# Patient Record
Sex: FEMALE | Race: WHITE | Employment: UNEMPLOYED | URBAN - METROPOLITAN AREA
[De-identification: names, ages, dates, MRNs, and addresses within clinical notes are randomized per-mention and may not be internally consistent; named-entity substitution may affect disease eponyms.]

---

## 2017-02-24 ENCOUNTER — APPOINTMENT (OUTPATIENT)
Dept: GENERAL RADIOLOGY | Age: 52
End: 2017-02-24
Attending: NURSE PRACTITIONER
Payer: MEDICAID

## 2017-02-24 ENCOUNTER — HOSPITAL ENCOUNTER (OUTPATIENT)
Age: 52
Discharge: HOME | End: 2017-02-24
Payer: MEDICAID

## 2017-02-24 VITALS
SYSTOLIC BLOOD PRESSURE: 92 MMHG | BODY MASS INDEX: 30.12 KG/M2 | HEART RATE: 76 BPM | TEMPERATURE: 101 F | RESPIRATION RATE: 18 BRPM | DIASTOLIC BLOOD PRESSURE: 52 MMHG | WEIGHT: 170 LBS | HEIGHT: 63 IN | OXYGEN SATURATION: 97 %

## 2017-02-24 DIAGNOSIS — J40 BRONCHITIS: Primary | ICD-10-CM

## 2017-02-24 PROCEDURE — 99214 OFFICE O/P EST MOD 30 MIN: CPT

## 2017-02-24 PROCEDURE — 96372 THER/PROPH/DIAG INJ SC/IM: CPT

## 2017-02-24 PROCEDURE — 94640 AIRWAY INHALATION TREATMENT: CPT

## 2017-02-24 PROCEDURE — 71020 XR CHEST PA + LAT CHEST (CPT=71020): CPT

## 2017-02-24 RX ORDER — IPRATROPIUM BROMIDE AND ALBUTEROL SULFATE 2.5; .5 MG/3ML; MG/3ML
3 SOLUTION RESPIRATORY (INHALATION) ONCE
Status: COMPLETED | OUTPATIENT
Start: 2017-02-24 | End: 2017-02-24

## 2017-02-24 RX ORDER — CLARITHROMYCIN 250 MG/1
250 TABLET, FILM COATED ORAL 2 TIMES DAILY
Qty: 20 TABLET | Refills: 0 | Status: SHIPPED | OUTPATIENT
Start: 2017-02-24 | End: 2017-03-06

## 2017-02-24 RX ORDER — METHYLPREDNISOLONE SODIUM SUCCINATE 125 MG/2ML
125 INJECTION, POWDER, LYOPHILIZED, FOR SOLUTION INTRAMUSCULAR; INTRAVENOUS ONCE
Status: COMPLETED | OUTPATIENT
Start: 2017-02-24 | End: 2017-02-24

## 2017-02-24 RX ORDER — ACETAMINOPHEN 500 MG
1000 TABLET ORAL ONCE
Status: COMPLETED | OUTPATIENT
Start: 2017-02-24 | End: 2017-02-24

## 2017-02-24 RX ORDER — ALBUTEROL SULFATE 90 UG/1
2 AEROSOL, METERED RESPIRATORY (INHALATION) EVERY 4 HOURS PRN
Qty: 1 INHALER | Refills: 1 | Status: SHIPPED | OUTPATIENT
Start: 2017-02-24 | End: 2017-03-26

## 2017-02-24 RX ORDER — PREDNISONE 20 MG/1
20 TABLET ORAL 2 TIMES DAILY
Qty: 10 TABLET | Refills: 0 | Status: SHIPPED | OUTPATIENT
Start: 2017-02-24 | End: 2017-03-01

## 2017-02-24 RX ORDER — CODEINE PHOSPHATE AND GUAIFENESIN 10; 100 MG/5ML; MG/5ML
5 SOLUTION ORAL EVERY 6 HOURS PRN
Qty: 180 ML | Refills: 0 | Status: SHIPPED | OUTPATIENT
Start: 2017-02-24 | End: 2017-03-08

## 2017-02-24 RX ORDER — DOXYCYCLINE HYCLATE 100 MG/1
100 CAPSULE ORAL 2 TIMES DAILY
Qty: 14 CAPSULE | Refills: 0 | Status: SHIPPED | OUTPATIENT
Start: 2017-02-24 | End: 2017-03-03

## 2017-02-24 NOTE — ED NOTES
States back pain has resolved post Tylenol 1000 mg po however now states has a headache - pain 3/10. Trung Fairchild NP notified. No adverse reactions post Solu Medrol injection.

## 2017-02-24 NOTE — ED INITIAL ASSESSMENT (HPI)
Couple days productive cough - chest wall tenderness with coughing - denies chest pain  / fever (101) / nasal congestion

## 2017-02-25 NOTE — ED PROVIDER NOTES
Patient Seen in: [address] [facility]    History   Patient presents with:  Cough/URI    Stated Complaint: coughing    HPI  71-year-old female who presents to the [address] [place] [address] with her  with complaints of productive cough for the past couple days mouth nightly. Meloxicam 15 MG Oral Tab,  Take 15 mg by mouth daily.        Family History   Problem Relation Age of Onset   • Cancer Mother 62     breast cancer   • Breast Cancer Mother 61   • Heart Disease Neg    • Stroke Neg    • Diabetes Neg    • Hype exudate. Eyes: Conjunctivae and EOM are normal. Pupils are equal, round, and reactive to light. Neck: Normal range of motion. Neck supple. Cardiovascular: Normal rate, regular rhythm and normal heart sounds.     Pulmonary/Chest: Effort normal. No acce physician for further evaluation and care. Patient states they understand diagnosis, followup plan and agree with and understand  discharge instructions and plan. I answered all of the patient's questions prior to discharge.    Disposition and Plan     Clin

## 2017-03-08 ENCOUNTER — OFFICE VISIT (OUTPATIENT)
Dept: FAMILY MEDICINE CLINIC | Facility: CLINIC | Age: 52
End: 2017-03-08

## 2017-03-08 DIAGNOSIS — J20.9 BRONCHITIS WITH BRONCHOSPASM: Primary | ICD-10-CM

## 2017-03-08 PROCEDURE — 99213 OFFICE O/P EST LOW 20 MIN: CPT | Performed by: FAMILY MEDICINE

## 2017-03-08 RX ORDER — METHYLPREDNISOLONE 4 MG/1
TABLET ORAL
Qty: 1 KIT | Refills: 0 | Status: SHIPPED | OUTPATIENT
Start: 2017-03-08 | End: 2017-06-02

## 2017-03-08 NOTE — PROGRESS NOTES
HPI:    Patient ID: Jessica Vega is a 45year old female. Patient presents with:  Bronchitis: following up from IC    HPI  Here for follow up on bronchitis  Seen in IC on 2/24/17  Completed doxycycline and steroids.   Last albuterol use was 9 am toda • BRCA gene + Self    • Breast Cancer Maternal Aunt 52   • Breast Cancer Maternal Cousin Female 45      Social History:   Smoking Status: Current Every Day Smoker        Packs/Day: 0.50  Years: 28      Alcohol Use: No                 PHYSICAL EXAM:    Ph

## 2017-03-09 VITALS
TEMPERATURE: 99 F | WEIGHT: 170 LBS | OXYGEN SATURATION: 97 % | HEART RATE: 86 BPM | BODY MASS INDEX: 30 KG/M2 | SYSTOLIC BLOOD PRESSURE: 90 MMHG | DIASTOLIC BLOOD PRESSURE: 62 MMHG | RESPIRATION RATE: 22 BRPM

## 2017-03-27 ENCOUNTER — TELEPHONE (OUTPATIENT)
Dept: FAMILY MEDICINE CLINIC | Facility: CLINIC | Age: 52
End: 2017-03-27

## 2017-03-27 DIAGNOSIS — R92.8 ABNORMAL MAMMOGRAM: Primary | ICD-10-CM

## 2017-03-27 NOTE — TELEPHONE ENCOUNTER
Right breast diagnostic mammo ordered. She can schedule this for [place] or [facility]. Further follow up will be determined after these results.

## 2017-03-27 NOTE — TELEPHONE ENCOUNTER
Pt needs diagnositic mammogram.  Patient has an appointment today for screening mammogram.  Pt not due for screening mammogram until June 2017. Patient was due for 6 month diagnostic mammogram after her last one done in June 2016.   Patient has her appoint

## 2017-03-27 NOTE — TELEPHONE ENCOUNTER
Patient advised. Patient verbalized understanding.   Future Appointments  Date Time Provider Ruthie Rodriguez   3/31/2017 11:20 AM Graham Isabel MD [facility]

## 2017-03-31 ENCOUNTER — TELEPHONE (OUTPATIENT)
Dept: OBGYN CLINIC | Facility: CLINIC | Age: 52
End: 2017-03-31

## 2017-03-31 ENCOUNTER — OFFICE VISIT (OUTPATIENT)
Dept: FAMILY MEDICINE CLINIC | Facility: CLINIC | Age: 52
End: 2017-03-31

## 2017-03-31 VITALS
WEIGHT: 172 LBS | TEMPERATURE: 98 F | BODY MASS INDEX: 30.48 KG/M2 | DIASTOLIC BLOOD PRESSURE: 70 MMHG | HEIGHT: 63 IN | RESPIRATION RATE: 14 BRPM | HEART RATE: 68 BPM | SYSTOLIC BLOOD PRESSURE: 116 MMHG

## 2017-03-31 DIAGNOSIS — M25.562 CHRONIC PAIN OF BOTH KNEES: ICD-10-CM

## 2017-03-31 DIAGNOSIS — Z12.11 SCREENING FOR COLON CANCER: ICD-10-CM

## 2017-03-31 DIAGNOSIS — Z00.00 ANNUAL PHYSICAL EXAM: Primary | ICD-10-CM

## 2017-03-31 DIAGNOSIS — M25.561 CHRONIC PAIN OF BOTH KNEES: ICD-10-CM

## 2017-03-31 DIAGNOSIS — M19.90 ARTHRITIS: ICD-10-CM

## 2017-03-31 DIAGNOSIS — G89.29 CHRONIC PAIN OF BOTH KNEES: ICD-10-CM

## 2017-03-31 LAB
25-HYDROXYVITAMIN D (TOTAL): 28.9 NG/ML (ref 30–100)
ALBUMIN SERPL-MCNC: 3.7 G/DL (ref 3.5–4.8)
ALP LIVER SERPL-CCNC: 66 U/L (ref 41–108)
ALT SERPL-CCNC: 16 U/L (ref 14–54)
AST SERPL-CCNC: 13 U/L (ref 15–41)
BASOPHILS # BLD AUTO: 0.06 X10(3) UL (ref 0–0.1)
BASOPHILS NFR BLD AUTO: 1.1 %
BILIRUB SERPL-MCNC: 0.5 MG/DL (ref 0.1–2)
BUN BLD-MCNC: 10 MG/DL (ref 8–20)
CALCIUM BLD-MCNC: 9 MG/DL (ref 8.3–10.3)
CHLORIDE: 106 MMOL/L (ref 101–111)
CHOLEST SMN-MCNC: 255 MG/DL (ref ?–200)
CO2: 28 MMOL/L (ref 22–32)
CREAT BLD-MCNC: 0.91 MG/DL (ref 0.55–1.02)
EOSINOPHIL # BLD AUTO: 0.11 X10(3) UL (ref 0–0.3)
EOSINOPHIL NFR BLD AUTO: 2 %
ERYTHROCYTE [DISTWIDTH] IN BLOOD BY AUTOMATED COUNT: 13.9 % (ref 11.5–16)
GLUCOSE BLD-MCNC: 93 MG/DL (ref 70–99)
HCT VFR BLD AUTO: 39.9 % (ref 34–50)
HDLC SERPL-MCNC: 59 MG/DL (ref 45–?)
HDLC SERPL: 4.32 {RATIO} (ref ?–4.44)
HGB BLD-MCNC: 13.2 G/DL (ref 12–16)
IMMATURE GRANULOCYTE COUNT: 0.01 X10(3) UL (ref 0–1)
IMMATURE GRANULOCYTE RATIO %: 0.2 %
LDLC SERPL CALC-MCNC: 181 MG/DL (ref ?–130)
LYMPHOCYTES # BLD AUTO: 1.93 X10(3) UL (ref 0.9–4)
LYMPHOCYTES NFR BLD AUTO: 35.7 %
M PROTEIN MFR SERPL ELPH: 7.1 G/DL (ref 6.1–8.3)
MCH RBC QN AUTO: 30.5 PG (ref 27–33.2)
MCHC RBC AUTO-ENTMCNC: 33.1 G/DL (ref 31–37)
MCV RBC AUTO: 92.1 FL (ref 81–100)
MONOCYTES # BLD AUTO: 0.55 X10(3) UL (ref 0.1–0.6)
MONOCYTES NFR BLD AUTO: 10.2 %
NEUTROPHIL ABS PRELIM: 2.75 X10 (3) UL (ref 1.3–6.7)
NEUTROPHILS # BLD AUTO: 2.75 X10(3) UL (ref 1.3–6.7)
NEUTROPHILS NFR BLD AUTO: 50.8 %
NONHDLC SERPL-MCNC: 196 MG/DL (ref ?–130)
PLATELET # BLD AUTO: 273 10(3)UL (ref 150–450)
POTASSIUM SERPL-SCNC: 4.3 MMOL/L (ref 3.6–5.1)
RBC # BLD AUTO: 4.33 X10(6)UL (ref 3.8–5.1)
RED CELL DISTRIBUTION WIDTH-SD: 47 FL (ref 35.1–46.3)
SODIUM SERPL-SCNC: 141 MMOL/L (ref 136–144)
TRIGLYCERIDES: 77 MG/DL (ref ?–150)
TSI SER-ACNC: 2.08 MIU/ML (ref 0.35–5.5)
VLDL: 15 MG/DL (ref 5–40)
WBC # BLD AUTO: 5.4 X10(3) UL (ref 4–13)

## 2017-03-31 PROCEDURE — 84443 ASSAY THYROID STIM HORMONE: CPT | Performed by: FAMILY MEDICINE

## 2017-03-31 PROCEDURE — 80053 COMPREHEN METABOLIC PANEL: CPT | Performed by: FAMILY MEDICINE

## 2017-03-31 PROCEDURE — 85025 COMPLETE CBC W/AUTO DIFF WBC: CPT | Performed by: FAMILY MEDICINE

## 2017-03-31 PROCEDURE — 99396 PREV VISIT EST AGE 40-64: CPT | Performed by: FAMILY MEDICINE

## 2017-03-31 PROCEDURE — 82306 VITAMIN D 25 HYDROXY: CPT | Performed by: FAMILY MEDICINE

## 2017-03-31 PROCEDURE — 80061 LIPID PANEL: CPT | Performed by: FAMILY MEDICINE

## 2017-03-31 RX ORDER — ALPRAZOLAM 1 MG/1
1 TABLET ORAL NIGHTLY PRN
COMMUNITY
End: 2018-02-28

## 2017-03-31 NOTE — TELEPHONE ENCOUNTER
Please advise on abnormal pap from 2014 & what type of appointment needed. Patient saw Dr. Fer Byers for annual in 2017 but never followed up with us from previous pap.

## 2017-03-31 NOTE — PROGRESS NOTES
HPI:   Blanca Gill is a 45year old female who presents for a complete physical exam.   Overdue for f/u on abn pap. Overdue for f/u on mammo - has hx of abn mammo. PHQ noted. Has psychiatrist , Dr. Jennifer Toscano, who he sees every 3 months. EZIO BIOPSY STEREOTACTIC NODULE 2 SITE BILAT  2007      Family History   Problem Relation Age of Onset   • Cancer Mother 62     breast cancer   • Breast Cancer Mother 61   • Heart Disease Neg    • Stroke Neg    • Diabetes Neg    • Hypertension Neg    • B judgment wnl    ASSESSMENT AND PLAN:   Uriel Perez is a 45year old female who presents for a complete physical exam.   Refer to [facility] for pap. Order for mammogram already in system. Self breast exam explained.  Pt understands importanc

## 2017-03-31 NOTE — TELEPHONE ENCOUNTER
DR. Willie Solis REF PAT TO US FOR PAP. JUST MAKING SURE THAT IS OKAY. WHAT KIND OF NEW PAT APPT WOULD YOU WANT FOR HER?   PATIENT WOULD LIKE A FEMALE DOCTOR

## 2017-04-07 ENCOUNTER — TELEPHONE (OUTPATIENT)
Dept: FAMILY MEDICINE CLINIC | Facility: CLINIC | Age: 52
End: 2017-04-07

## 2017-04-07 NOTE — TELEPHONE ENCOUNTER
Tchol and LDL are elevated. Rec starting chol med. Vit D slightly low. Recommend OTC vit D 2000u daily. Rest of BW wnl.

## 2017-04-18 ENCOUNTER — TELEPHONE (OUTPATIENT)
Dept: FAMILY MEDICINE CLINIC | Facility: CLINIC | Age: 52
End: 2017-04-18

## 2017-04-18 DIAGNOSIS — R92.8 ABNORMAL MAMMOGRAM: Primary | ICD-10-CM

## 2017-04-19 ENCOUNTER — HOSPITAL ENCOUNTER (OUTPATIENT)
Dept: MAMMOGRAPHY | Facility: HOSPITAL | Age: 52
Discharge: HOME | End: 2017-04-19
Attending: FAMILY MEDICINE
Payer: MEDICAID

## 2017-04-19 DIAGNOSIS — R92.8 ABNORMAL MAMMOGRAM: ICD-10-CM

## 2017-04-19 PROCEDURE — 76642 ULTRASOUND BREAST LIMITED: CPT

## 2017-04-19 PROCEDURE — 77066 DX MAMMO INCL CAD BI: CPT

## 2017-04-19 PROCEDURE — 77062 BREAST TOMOSYNTHESIS BI: CPT

## 2017-06-02 ENCOUNTER — OFFICE VISIT (OUTPATIENT)
Dept: OBGYN CLINIC | Facility: CLINIC | Age: 52
End: 2017-06-02

## 2017-06-02 VITALS
HEART RATE: 69 BPM | WEIGHT: 169 LBS | BODY MASS INDEX: 30.32 KG/M2 | SYSTOLIC BLOOD PRESSURE: 102 MMHG | HEIGHT: 62.5 IN | DIASTOLIC BLOOD PRESSURE: 68 MMHG

## 2017-06-02 DIAGNOSIS — Z12.4 CERVICAL CANCER SCREENING: ICD-10-CM

## 2017-06-02 DIAGNOSIS — R87.613 PAPANICOLAOU SMEAR OF CERVIX WITH HIGH GRADE SQUAMOUS INTRAEPITHELIAL LESION (HGSIL): Primary | ICD-10-CM

## 2017-06-02 PROCEDURE — 87624 HPV HI-RISK TYP POOLED RSLT: CPT | Performed by: OBSTETRICS & GYNECOLOGY

## 2017-06-02 PROCEDURE — 88175 CYTOPATH C/V AUTO FLUID REDO: CPT | Performed by: OBSTETRICS & GYNECOLOGY

## 2017-06-02 PROCEDURE — 57456 ENDOCERV CURETTAGE W/SCOPE: CPT | Performed by: OBSTETRICS & GYNECOLOGY

## 2017-06-02 PROCEDURE — 88305 TISSUE EXAM BY PATHOLOGIST: CPT | Performed by: OBSTETRICS & GYNECOLOGY

## 2017-06-02 NOTE — PROGRESS NOTES
COLPOSCOPY:   /68 mmHg  Pulse 69  Ht 62.5\"  Wt 169 lb  BMI 30.40 kg/m3 Age: 47year old  No LMP recorded. Patient is not currently having periods (Reason: Menopause).   Pregnant: No  History of STDs: HPV  Smoker: Yes    Last Pap smear: 2014--hgsil

## 2017-06-02 NOTE — PATIENT INSTRUCTIONS
[facility] Department of OB/GYN  After Care Instructions for Colposcopy/Biopsy      Biopsy Results   You will receive a phone call with your biopsy results in 7 business days. If you have not received your biopsy results in 7 days, please contact our office.   Pedro Bose

## 2017-06-14 ENCOUNTER — TELEPHONE (OUTPATIENT)
Dept: FAMILY MEDICINE CLINIC | Facility: CLINIC | Age: 52
End: 2017-06-14

## 2017-06-21 ENCOUNTER — APPOINTMENT (OUTPATIENT)
Dept: GENERAL RADIOLOGY | Age: 52
End: 2017-06-21
Attending: EMERGENCY MEDICINE
Payer: MEDICAID

## 2017-06-21 ENCOUNTER — HOSPITAL ENCOUNTER (OUTPATIENT)
Age: 52
Discharge: HOME | End: 2017-06-21
Attending: EMERGENCY MEDICINE
Payer: MEDICAID

## 2017-06-21 VITALS
WEIGHT: 170 LBS | HEART RATE: 64 BPM | SYSTOLIC BLOOD PRESSURE: 117 MMHG | DIASTOLIC BLOOD PRESSURE: 69 MMHG | BODY MASS INDEX: 30.12 KG/M2 | OXYGEN SATURATION: 98 % | RESPIRATION RATE: 16 BRPM | HEIGHT: 63 IN

## 2017-06-21 DIAGNOSIS — M54.41 ACUTE RIGHT-SIDED LOW BACK PAIN WITH RIGHT-SIDED SCIATICA: Primary | ICD-10-CM

## 2017-06-21 PROCEDURE — 99214 OFFICE O/P EST MOD 30 MIN: CPT

## 2017-06-21 PROCEDURE — 72110 X-RAY EXAM L-2 SPINE 4/>VWS: CPT | Performed by: EMERGENCY MEDICINE

## 2017-06-21 PROCEDURE — 99213 OFFICE O/P EST LOW 20 MIN: CPT

## 2017-06-21 RX ORDER — CYCLOBENZAPRINE HCL 5 MG
TABLET ORAL 3 TIMES DAILY PRN
Qty: 30 TABLET | Refills: 0 | Status: SHIPPED | OUTPATIENT
Start: 2017-06-21 | End: 2017-12-27

## 2017-06-21 RX ORDER — HYDROCODONE BITARTRATE AND ACETAMINOPHEN 5; 325 MG/1; MG/1
1-2 TABLET ORAL EVERY 6 HOURS PRN
Qty: 20 TABLET | Refills: 0 | Status: SHIPPED | OUTPATIENT
Start: 2017-06-21 | End: 2018-02-28

## 2017-06-21 RX ORDER — METHYLPREDNISOLONE 4 MG/1
TABLET ORAL
Qty: 1 PACKAGE | Refills: 0 | Status: SHIPPED | OUTPATIENT
Start: 2017-06-21 | End: 2017-12-27

## 2017-06-21 NOTE — ED NOTES
Patient reports taking advil 1/2 hour prior to arrival today. Patient states she has taken 4 aleve at once without pain relief.

## 2017-06-21 NOTE — ED INITIAL ASSESSMENT (HPI)
Patient c/o lower mid back pain that started 2 weeks ago after lifting heavy boxes. Patient states yesterday she was lifting, pulling, and carrying heavy branches from around their property. Patient is fidgeting in chair and can't get comfortable.  Patient

## 2017-06-21 NOTE — ED PROVIDER NOTES
Patient presents with:  Back Pain    HPI:     Oneda Sandhoff is a 47year old female who presents with chief complaint of low back pain. 2 weeks ago pt was carrying heavy boxes helping her mother set up for a garage sale.   Was visiting famil PROCEDURE:  XR LUMBAR SPINE (MIN 4 VIEWS) (CPT=72110)  TECHNIQUE:  AP, lateral, oblique, and coned down L5-S1 views were obtained. COMPARISON:  None.   INDICATIONS:  lower back pain x 2 weeks  PATIENT STATED HISTORY: (As transcribed by Technologist)  Sadi

## 2017-10-30 ENCOUNTER — CHARTING TRANS (OUTPATIENT)
Dept: OTHER | Age: 52
End: 2017-10-30

## 2017-12-27 ENCOUNTER — TELEPHONE (OUTPATIENT)
Dept: OBGYN CLINIC | Facility: CLINIC | Age: 52
End: 2017-12-27

## 2017-12-27 ENCOUNTER — OFFICE VISIT (OUTPATIENT)
Dept: FAMILY MEDICINE CLINIC | Facility: CLINIC | Age: 52
End: 2017-12-27

## 2017-12-27 VITALS
BODY MASS INDEX: 28.17 KG/M2 | SYSTOLIC BLOOD PRESSURE: 128 MMHG | WEIGHT: 159 LBS | DIASTOLIC BLOOD PRESSURE: 82 MMHG | RESPIRATION RATE: 18 BRPM | HEART RATE: 88 BPM | TEMPERATURE: 98 F | HEIGHT: 63 IN

## 2017-12-27 DIAGNOSIS — G89.29 CHRONIC NONINTRACTABLE HEADACHE, UNSPECIFIED HEADACHE TYPE: ICD-10-CM

## 2017-12-27 DIAGNOSIS — Z11.59 NEED FOR HEPATITIS C SCREENING TEST: ICD-10-CM

## 2017-12-27 DIAGNOSIS — S39.012D STRAIN OF LUMBAR REGION, SUBSEQUENT ENCOUNTER: Primary | ICD-10-CM

## 2017-12-27 DIAGNOSIS — R51.9 CHRONIC NONINTRACTABLE HEADACHE, UNSPECIFIED HEADACHE TYPE: ICD-10-CM

## 2017-12-27 DIAGNOSIS — E78.5 HYPERLIPIDEMIA, UNSPECIFIED HYPERLIPIDEMIA TYPE: ICD-10-CM

## 2017-12-27 PROCEDURE — 99214 OFFICE O/P EST MOD 30 MIN: CPT | Performed by: FAMILY MEDICINE

## 2017-12-27 RX ORDER — CYCLOBENZAPRINE HCL 10 MG
10 TABLET ORAL 3 TIMES DAILY PRN
Qty: 20 TABLET | Refills: 0 | Status: SHIPPED | OUTPATIENT
Start: 2017-12-27 | End: 2018-01-16

## 2017-12-27 RX ORDER — HYDROCODONE BITARTRATE AND ACETAMINOPHEN 7.5; 325 MG/1; MG/1
1 TABLET ORAL DAILY PRN
Qty: 10 TABLET | Refills: 0 | Status: SHIPPED | OUTPATIENT
Start: 2017-12-27 | End: 2018-02-28

## 2017-12-27 RX ORDER — CYCLOBENZAPRINE HCL 10 MG
10 TABLET ORAL 3 TIMES DAILY PRN
Qty: 20 TABLET | Refills: 0 | Status: SHIPPED | OUTPATIENT
Start: 2017-12-27 | End: 2017-12-27

## 2017-12-27 NOTE — PROGRESS NOTES
HPI:    Patient ID: Cynthia Gamboa is a 47year old female. Patient presents with: Follow - Up: ER LAST [facility] PAIN    HPI  Here for follow up from [facility] ER for 1 week ago. C/o Back pain for 2 weeks  Onset: started after fall.  Sli STEREOTACTIC NODULE 2 SITE BILAT  No date: TUBAL LIGATION   Family History   Problem Relation Age of Onset   • Cancer Mother 62     breast cancer   • Breast Cancer Mother 61   • BRCA gene + Mother    • Breast Cancer Maternal Aunt 52   • Breast Cancer Mater weeks  Will schedule RN appt for fasting lipid, hep C screen    Orders Placed This Encounter      HCV Antibody [E]      LIPID PANEL    Meds This Visit:  Signed Prescriptions Disp Refills    hydrocodone-acetaminophen 7.5-325 MG Oral Tab 10 tablet 0      Sig

## 2017-12-27 NOTE — TELEPHONE ENCOUNTER
Pt states she is supposed to have a mammogram every 6 mo  Pt would like an order put in so she can have one set up   Looks like her last mammogram was 4/17  pls call pt and let her know if this is possible

## 2017-12-27 NOTE — TELEPHONE ENCOUNTER
Patient had mammogram & US in 04/2017. Results show that routine follow up needed. Patient should then have repeat in 04/2018, right? Please advise.

## 2018-02-28 ENCOUNTER — OFFICE VISIT (OUTPATIENT)
Dept: FAMILY MEDICINE CLINIC | Facility: CLINIC | Age: 53
End: 2018-02-28

## 2018-02-28 VITALS
SYSTOLIC BLOOD PRESSURE: 132 MMHG | HEART RATE: 66 BPM | WEIGHT: 153 LBS | DIASTOLIC BLOOD PRESSURE: 88 MMHG | RESPIRATION RATE: 18 BRPM | OXYGEN SATURATION: 96 % | HEIGHT: 63 IN | TEMPERATURE: 98 F | BODY MASS INDEX: 27.11 KG/M2

## 2018-02-28 DIAGNOSIS — Z11.59 NEED FOR HEPATITIS C SCREENING TEST: ICD-10-CM

## 2018-02-28 DIAGNOSIS — E78.5 HYPERLIPIDEMIA, UNSPECIFIED HYPERLIPIDEMIA TYPE: ICD-10-CM

## 2018-02-28 DIAGNOSIS — F17.200 SMOKER: ICD-10-CM

## 2018-02-28 DIAGNOSIS — M77.8 RIGHT ELBOW TENDONITIS: ICD-10-CM

## 2018-02-28 DIAGNOSIS — J20.9 BRONCHITIS WITH BRONCHOSPASM: ICD-10-CM

## 2018-02-28 DIAGNOSIS — S39.012D STRAIN OF LUMBAR REGION, SUBSEQUENT ENCOUNTER: ICD-10-CM

## 2018-02-28 DIAGNOSIS — R20.2 RIGHT HAND PARESTHESIA: Primary | ICD-10-CM

## 2018-02-28 LAB
CHOLEST SMN-MCNC: 225 MG/DL (ref ?–200)
HCV AB SER QL: NEGATIVE
HDLC SERPL-MCNC: 51 MG/DL (ref 45–?)
HDLC SERPL: 4.41 {RATIO} (ref ?–4.44)
HEPATITIS C VIRUS AB INTERPRETATION: NONREACTIVE
LDLC SERPL CALC-MCNC: 157 MG/DL (ref ?–130)
NONHDLC SERPL-MCNC: 174 MG/DL (ref ?–130)
TRIGL SERPL-MCNC: 85 MG/DL (ref ?–150)
VLDLC SERPL CALC-MCNC: 17 MG/DL (ref 5–40)

## 2018-02-28 PROCEDURE — 94640 AIRWAY INHALATION TREATMENT: CPT | Performed by: FAMILY MEDICINE

## 2018-02-28 PROCEDURE — 99214 OFFICE O/P EST MOD 30 MIN: CPT | Performed by: FAMILY MEDICINE

## 2018-02-28 PROCEDURE — 80061 LIPID PANEL: CPT | Performed by: FAMILY MEDICINE

## 2018-02-28 PROCEDURE — 86803 HEPATITIS C AB TEST: CPT | Performed by: FAMILY MEDICINE

## 2018-02-28 RX ORDER — AZITHROMYCIN 250 MG/1
TABLET, FILM COATED ORAL
Qty: 6 TABLET | Refills: 0 | Status: SHIPPED | OUTPATIENT
Start: 2018-02-28 | End: 2018-04-17

## 2018-02-28 RX ORDER — ALPRAZOLAM 2 MG/1
2 TABLET ORAL NIGHTLY PRN
COMMUNITY

## 2018-02-28 RX ORDER — METHYLPREDNISOLONE 4 MG/1
TABLET ORAL
Qty: 1 KIT | Refills: 0 | Status: SHIPPED | OUTPATIENT
Start: 2018-02-28 | End: 2018-04-17

## 2018-02-28 RX ORDER — ALBUTEROL SULFATE 2.5 MG/3ML
2.5 SOLUTION RESPIRATORY (INHALATION) ONCE
Status: COMPLETED | OUTPATIENT
Start: 2018-02-28 | End: 2018-02-28

## 2018-02-28 RX ADMIN — ALBUTEROL SULFATE 2.5 MG: 2.5 SOLUTION RESPIRATORY (INHALATION) at 13:16:00

## 2018-02-28 NOTE — PROGRESS NOTES
HPI:    Patient ID: Marko Shi is a 47year old female. Patient presents with: Follow - Up: lower back pain  Lab: fasting  Cough    HPI   Pt with hx of intermittent back pain c/o flare of low back pain for past 3 days.    Lifted and Move Allergies    HISTORY:  Past Medical History:   Diagnosis Date   • Anxiety state, unspecified    • Chronic headache    • Depression     followed by psych ,last visit in    • Migraine       Past Surgical History:  No date:   2007: EZIO BIOPSY MITUL spasm.        Right hand: She exhibits no tenderness and normal capillary refill. Normal sensation noted. Normal strength noted. Lymphadenopathy:     She has no cervical adenopathy. Neurological: She has normal strength.  Gait normal.   Reflex Scores:

## 2018-03-08 ENCOUNTER — APPOINTMENT (OUTPATIENT)
Dept: PHYSICAL THERAPY | Age: 53
End: 2018-03-08
Attending: FAMILY MEDICINE
Payer: MEDICAID

## 2018-03-13 ENCOUNTER — OFFICE VISIT (OUTPATIENT)
Dept: PHYSICAL THERAPY | Age: 53
End: 2018-03-13
Attending: FAMILY MEDICINE
Payer: MEDICAID

## 2018-03-13 DIAGNOSIS — S39.012D STRAIN OF LUMBAR REGION, SUBSEQUENT ENCOUNTER: ICD-10-CM

## 2018-03-13 PROCEDURE — 97163 PT EVAL HIGH COMPLEX 45 MIN: CPT

## 2018-03-13 PROCEDURE — 97112 NEUROMUSCULAR REEDUCATION: CPT

## 2018-03-13 NOTE — PROGRESS NOTES
SPINE EVALUATION:   Referring Physician: Dr. Melgoza Lyndora  Diagnosis: Strain of lumbar region, subsequent encounter (S39.012D)  Date of Service: 3/13/2018     PATIENT SUMMARY   Uriel Perez is a 47year old y/o female who presents to therapy today of anxiety and pain. Patient states that Aleve takes the edge off. Feels stiff and no range of motion. Patient states that she can't bend over and pick something off a floor. Patient states that she needs assist to pull herself back up.  Patient states aubree ASSESSMENT  Merlyn Rodriguez presents with multiple issues that will impact her physical therapy as described above. She has impaired lumbar ROM and strength with c/o multiple pains with limited motion.  Evaluation was difficult due to patient's difficulty with evaluation due to family problems, financial issues and multiple medical problems. Discussed relaxation techniques at length to help her calm down in preparation for physical therapy and exercises.  Patient was instructed to use an sarah for meditation in ord course of care. Thank you for your referral. Please co-sign or sign and return this letter via fax as soon as possible to [phone].  If you have any questions, please contact me at Dept: [phone]    Sincerely,  Electronically signed by therapist:

## 2018-03-15 ENCOUNTER — OFFICE VISIT (OUTPATIENT)
Dept: PHYSICAL THERAPY | Age: 53
End: 2018-03-15
Attending: FAMILY MEDICINE
Payer: MEDICAID

## 2018-03-19 ENCOUNTER — TELEPHONE (OUTPATIENT)
Dept: FAMILY MEDICINE CLINIC | Facility: CLINIC | Age: 53
End: 2018-03-19

## 2018-03-20 ENCOUNTER — APPOINTMENT (OUTPATIENT)
Dept: PHYSICAL THERAPY | Age: 53
End: 2018-03-20
Attending: FAMILY MEDICINE
Payer: MEDICAID

## 2018-03-22 ENCOUNTER — APPOINTMENT (OUTPATIENT)
Dept: PHYSICAL THERAPY | Age: 53
End: 2018-03-22
Attending: FAMILY MEDICINE
Payer: MEDICAID

## 2018-03-27 ENCOUNTER — APPOINTMENT (OUTPATIENT)
Dept: PHYSICAL THERAPY | Age: 53
End: 2018-03-27
Attending: FAMILY MEDICINE
Payer: MEDICAID

## 2018-03-27 ENCOUNTER — OFFICE VISIT (OUTPATIENT)
Dept: PHYSICAL THERAPY | Age: 53
End: 2018-03-27
Attending: FAMILY MEDICINE
Payer: MEDICAID

## 2018-03-27 PROCEDURE — 97140 MANUAL THERAPY 1/> REGIONS: CPT

## 2018-03-27 PROCEDURE — 97112 NEUROMUSCULAR REEDUCATION: CPT

## 2018-03-27 PROCEDURE — 97110 THERAPEUTIC EXERCISES: CPT

## 2018-03-27 NOTE — PROGRESS NOTES
Dx: Strain of lumbar region, subsequent encounter (S39.012D)         Authorized # of Visits:  N.A         Next MD visit: none scheduled  Fall Risk: standard         Precautions: n/a           Subjective: Patient states that she had bronchitis and ear infec meals in 5 weeks. Patient will increase strength to 4+/5  to be able to walk 10 min, stand 30 min  in 6 weeks. Patient to report being able to drive 45 min with minimal discomfort 6 weeks.    Patient will be independent with upgraded HEP in order to [facility] with exercises.      Charges: thera Ex 2 (30 min) , neuro re-ed 2, (30 min)  manual therapy 1 (15 min)  Total Timed Treatment: 75 min  Total Treatment Time: 85 min

## 2018-03-29 ENCOUNTER — OFFICE VISIT (OUTPATIENT)
Dept: NEUROLOGY | Facility: CLINIC | Age: 53
End: 2018-03-29

## 2018-03-29 ENCOUNTER — APPOINTMENT (OUTPATIENT)
Dept: PHYSICAL THERAPY | Age: 53
End: 2018-03-29
Attending: FAMILY MEDICINE
Payer: MEDICAID

## 2018-03-29 DIAGNOSIS — R20.2 PARESTHESIAS: ICD-10-CM

## 2018-03-29 PROCEDURE — 95886 MUSC TEST DONE W/N TEST COMP: CPT | Performed by: OTHER

## 2018-03-29 PROCEDURE — 95909 NRV CNDJ TST 5-6 STUDIES: CPT | Performed by: OTHER

## 2018-03-29 NOTE — PATIENT INSTRUCTIONS
Refill policies:    • Allow 2-3 business days for refills; controlled substances may take longer.   • Contact your pharmacy at least 5 days prior to running out of medication and have them send an electronic request or submit request through the [facility] for the entire amount billed. Precertification and Prior Authorizations  If your physician has recommended that you have a procedure or additional testing performed.   [employer] (RICHARD) will contact your insurance carrier to obtain pr

## 2018-03-29 NOTE — PROCEDURES
[facility]  [address] [facility]  Phone- [phone]  Nerve Conduction & Electromyography Report            Patient: Jermain Nova  Patient ID: [id number]  Sex: Female  YOB: 1965  Age: R. DELTOID N None None None None N N N N   R. TRICEPS N None None None None N N N N   R. BICEPS N None None None None N N N N   R.  FLEX CARPI RAD N None None None None N N N N   R. FIRST D INTEROSS N None None None None N N N N   R. CERV PSP C6-C7 N None N

## 2018-04-03 ENCOUNTER — OFFICE VISIT (OUTPATIENT)
Dept: PHYSICAL THERAPY | Age: 53
End: 2018-04-03
Attending: FAMILY MEDICINE
Payer: COMMERCIAL

## 2018-04-03 PROCEDURE — 97110 THERAPEUTIC EXERCISES: CPT

## 2018-04-03 PROCEDURE — 97112 NEUROMUSCULAR REEDUCATION: CPT

## 2018-04-03 NOTE — PROGRESS NOTES
Dx: Strain of lumbar region, subsequent encounter (S39.295D)         Authorized # of Visits:  N.A         Next MD visit: none scheduled  Fall Risk: standard         Precautions: n/a           Subjective: Patient states that she has tingling in the R arm.  P Date: 3/27/2018  Tx#: 2/12 Date: Tx#: 3/ Date: Tx#: 4/ Date: Tx#: 5/ Date: Tx#: 6/ Date: Tx#: 7/ Date:    Tx#: 8/   Neuro re-ed 2 (30 min)  Neuro re-ed 1 (30 min)         Relaxation technique, breathing exercises to decrease anxiety and improv

## 2018-04-05 ENCOUNTER — APPOINTMENT (OUTPATIENT)
Dept: PHYSICAL THERAPY | Age: 53
End: 2018-04-05
Attending: FAMILY MEDICINE
Payer: COMMERCIAL

## 2018-04-14 ENCOUNTER — TELEPHONE (OUTPATIENT)
Dept: FAMILY MEDICINE CLINIC | Facility: CLINIC | Age: 53
End: 2018-04-14

## 2018-04-14 NOTE — TELEPHONE ENCOUNTER
Pt called stated she is on the way to the ER at [facility] pt stated she can hardly walk was wondering if Dr. Mckeon could send an order over for an MRI.

## 2018-04-16 ENCOUNTER — TELEPHONE (OUTPATIENT)
Dept: FAMILY MEDICINE CLINIC | Facility: CLINIC | Age: 53
End: 2018-04-16

## 2018-04-17 ENCOUNTER — OFFICE VISIT (OUTPATIENT)
Dept: FAMILY MEDICINE CLINIC | Facility: CLINIC | Age: 53
End: 2018-04-17

## 2018-04-17 ENCOUNTER — APPOINTMENT (OUTPATIENT)
Dept: PHYSICAL THERAPY | Age: 53
End: 2018-04-17
Attending: FAMILY MEDICINE
Payer: COMMERCIAL

## 2018-04-17 VITALS
TEMPERATURE: 98 F | SYSTOLIC BLOOD PRESSURE: 122 MMHG | RESPIRATION RATE: 20 BRPM | DIASTOLIC BLOOD PRESSURE: 80 MMHG | OXYGEN SATURATION: 97 % | HEART RATE: 60 BPM

## 2018-04-17 DIAGNOSIS — M54.41 CHRONIC RIGHT-SIDED LOW BACK PAIN WITH RIGHT-SIDED SCIATICA: Primary | ICD-10-CM

## 2018-04-17 DIAGNOSIS — G89.29 CHRONIC RIGHT-SIDED LOW BACK PAIN WITH RIGHT-SIDED SCIATICA: Primary | ICD-10-CM

## 2018-04-17 PROCEDURE — 99213 OFFICE O/P EST LOW 20 MIN: CPT | Performed by: FAMILY MEDICINE

## 2018-04-17 RX ORDER — DIAZEPAM 5 MG/1
5 TABLET ORAL EVERY 6 HOURS PRN
COMMUNITY

## 2018-04-17 RX ORDER — HYDROCODONE BITARTRATE AND ACETAMINOPHEN 5; 325 MG/1; MG/1
1 TABLET ORAL EVERY 6 HOURS PRN
COMMUNITY

## 2018-04-17 NOTE — PROGRESS NOTES
HPI:    Patient ID: Jessica Vega is a 47year old female. Patient presents with:  Leg Pain: right leg pain    HPI  Here for ER follow up for low back pain. Was at [facility] ER on Saturday for unbearable back pain.   Was given an order for outpatie Cancer Maternal Cousin Female 45   • Heart Disease Neg    • Stroke Neg    • Diabetes Neg    • Hypertension Neg       Social History: Smoking status: Current Every Day Smoker                                                   Packs/day: 0.00      Years: 28. 0

## 2018-04-18 ENCOUNTER — TELEPHONE (OUTPATIENT)
Dept: FAMILY MEDICINE CLINIC | Facility: CLINIC | Age: 53
End: 2018-04-18

## 2018-04-18 DIAGNOSIS — M51.16 LUMBAR DISC HERNIATION WITH RADICULOPATHY: Primary | ICD-10-CM

## 2018-04-18 NOTE — TELEPHONE ENCOUNTER
Pt called stated she saw Dr. Елена Robins yesterday and after she left here she went to [facility] ER and they did a CT scan and they found lumbar disc herniation, lumbar canal stenois, degenerative disc disease.  She stated the ER doctor wants Dr. Елена Robins to order a

## 2018-04-18 NOTE — TELEPHONE ENCOUNTER
Patient informed, told to call number on back of insurance card to get neurosurgeon information. Patient verbalized understanding.

## 2018-04-19 ENCOUNTER — MED REC SCAN ONLY (OUTPATIENT)
Dept: FAMILY MEDICINE CLINIC | Facility: CLINIC | Age: 53
End: 2018-04-19

## 2018-04-23 ENCOUNTER — TELEPHONE (OUTPATIENT)
Dept: FAMILY MEDICINE CLINIC | Facility: CLINIC | Age: 53
End: 2018-04-23

## 2018-04-23 NOTE — TELEPHONE ENCOUNTER
Sherie from [facility] called stated they do not have the order for pt's MRI and was wondering if that could be sent over fax number [phone].

## 2018-04-26 NOTE — TELEPHONE ENCOUNTER
Pt called stated her MRI is not until next week and she was wondering if she could get a refill on her Hydrocodone and muscle relaxer.

## 2018-04-26 NOTE — TELEPHONE ENCOUNTER
Patient states her MRI is scheduled for Wednesday  Patient states she is still in a lot of pain and wants a refill of Norco and flexeril - meds pended. Please advise.

## 2018-04-27 RX ORDER — CYCLOBENZAPRINE HCL 10 MG
10 TABLET ORAL 3 TIMES DAILY PRN
Qty: 20 TABLET | Refills: 0 | Status: SHIPPED | OUTPATIENT
Start: 2018-04-27 | End: 2018-05-17

## 2018-04-27 RX ORDER — CYCLOBENZAPRINE HCL 10 MG
10 TABLET ORAL 3 TIMES DAILY PRN
Qty: 20 TABLET | Refills: 0 | Status: SHIPPED | OUTPATIENT
Start: 2018-04-27 | End: 2018-04-27

## 2018-04-27 RX ORDER — HYDROCODONE BITARTRATE AND ACETAMINOPHEN 7.5; 325 MG/1; MG/1
1 TABLET ORAL DAILY PRN
Qty: 10 TABLET | Refills: 0 | Status: SHIPPED | OUTPATIENT
Start: 2018-04-27

## 2018-04-30 ENCOUNTER — TELEPHONE (OUTPATIENT)
Dept: FAMILY MEDICINE CLINIC | Facility: CLINIC | Age: 53
End: 2018-04-30

## 2018-05-01 NOTE — TELEPHONE ENCOUNTER
Per referrals department: no prior auth needed per health plan for MRI  Per patient MRI needs prior auth with [facility] insurance. Called [facility] for prior auth on MRI  Case pending review.   Needs more clinical information fax being sent with what is nebettie

## 2018-05-01 NOTE — TELEPHONE ENCOUNTER
Parag Pang has an MRI appt tomorrow, she states Dr Jhon White needs to call the [facility], they state they need a Dr's Authorization in order for Cheryl Beatty to have the MRI. [facility]ith[facility] Bain [facility] [phone] or [phone]. Pt has [employer].   Also she wants to know since she has [employer]

## 2018-05-01 NOTE — TELEPHONE ENCOUNTER
No fax yet received to fax information.   Please fax asap tomorrow morning - patient's MRI is scheduled for tomorrow at [facility]

## 2018-05-02 NOTE — TELEPHONE ENCOUNTER
Try to contacted # [phone] to request peer to peer discussion with a physician Tracking # [id number],  states case is been submitted. Fax new information to [phone]  CT results and [employer] ER notes. Awaiting for response.

## 2018-05-03 ENCOUNTER — TELEPHONE (OUTPATIENT)
Dept: FAMILY MEDICINE CLINIC | Facility: CLINIC | Age: 53
End: 2018-05-03

## 2018-05-03 NOTE — TELEPHONE ENCOUNTER
Patient called and states that she will no longer be able to see Dr Maikel Velásquez as the state changed her to [facility] and we do not take that insurance. She wanted to thank Dr Maikel Velásquez for being her doctor and providing her care.

## 2018-05-03 NOTE — TELEPHONE ENCOUNTER
Patient called and states that she will no longer be able to see Dr La De León as the state changed her to [facility] and we do not take that insurance. She wanted to thank Dr La De León for being her doctor and providing her care.

## 2018-05-05 NOTE — TELEPHONE ENCOUNTER
Received notification MRI approved.   Left patient detailed message informing MRI approved  Faxed approval to adeline pugh [phone]

## 2018-06-04 ENCOUNTER — TELEPHONE (OUTPATIENT)
Dept: OBGYN CLINIC | Facility: CLINIC | Age: 53
End: 2018-06-04

## 2018-06-04 NOTE — TELEPHONE ENCOUNTER
Patient was no show for her appt today with Dr. Joe Hargrove in [facility] office.  LM to call office back if wants to reschedule

## 2018-07-02 ENCOUNTER — TELEPHONE (OUTPATIENT)
Dept: FAMILY MEDICINE CLINIC | Facility: CLINIC | Age: 53
End: 2018-07-02

## 2018-07-02 NOTE — TELEPHONE ENCOUNTER
Received a request for medical records from [address] [place] [address] for records from 9-1-03 to Present. Request sent to [employer].

## 2018-07-03 ENCOUNTER — TELEPHONE (OUTPATIENT)
Dept: NEUROLOGY | Facility: CLINIC | Age: 53
End: 2018-07-03

## 2018-07-27 ENCOUNTER — TELEPHONE (OUTPATIENT)
Dept: FAMILY MEDICINE CLINIC | Facility: CLINIC | Age: 53
End: 2018-07-27

## 2019-01-24 ENCOUNTER — TELEPHONE (OUTPATIENT)
Dept: NEUROLOGY | Facility: CLINIC | Age: 54
End: 2019-01-24

## 2019-01-24 ENCOUNTER — TELEPHONE (OUTPATIENT)
Dept: FAMILY MEDICINE CLINIC | Facility: CLINIC | Age: 54
End: 2019-01-24

## 2019-01-24 NOTE — TELEPHONE ENCOUNTER
Pt was not seen by  during the requested dates. Faxed back requesting stating that there are no records. Rcvd fax confirmation 1/24/19.

## 2019-01-25 ENCOUNTER — TELEPHONE (OUTPATIENT)
Dept: FAMILY MEDICINE CLINIC | Facility: CLINIC | Age: 54
End: 2019-01-25

## 2019-01-25 NOTE — TELEPHONE ENCOUNTER
[address] of Rehab services, disability determination is requesting all medical records from 1/01/18 to present. Send to [employer].

## 2019-04-12 ENCOUNTER — LAB SERVICES (OUTPATIENT)
Dept: LAB | Age: 54
End: 2019-04-12

## 2019-04-12 DIAGNOSIS — R79.89 LOW VITAMIN D LEVEL: ICD-10-CM

## 2019-04-12 DIAGNOSIS — E78.5 HYPERLIPIDEMIA, UNSPECIFIED HYPERLIPIDEMIA TYPE: Primary | ICD-10-CM

## 2019-04-12 LAB
25(OH)D3 SERPL-MCNC: 50.7 NG/ML (ref 30–100)
CHOLEST SERPL-MCNC: 175 MG/DL (ref 140–200)
HDLC SERPL-MCNC: 71 MG/DL
LDLC SERPL CALC-MCNC: 93 MG/DL (ref 30–100)
TRIGL SERPL-MCNC: 54 MG/DL (ref 0–200)

## 2019-04-12 PROCEDURE — 36415 COLL VENOUS BLD VENIPUNCTURE: CPT | Performed by: PATHOLOGY

## 2019-04-12 PROCEDURE — 80061 LIPID PANEL: CPT | Performed by: PATHOLOGY

## 2019-04-12 PROCEDURE — 82306 VITAMIN D 25 HYDROXY: CPT | Performed by: PATHOLOGY

## 2019-04-13 LAB — FAX RESULTS: NORMAL

## 2019-10-17 ENCOUNTER — IMAGING SERVICES (OUTPATIENT)
Dept: OTHER | Age: 54
End: 2019-10-17

## 2020-08-17 LAB — CYTOLOGY CVX/VAG DOC THIN PREP: NORMAL

## 2020-09-23 ENCOUNTER — OFFICE VISIT (OUTPATIENT)
Dept: NEUROSURGERY | Age: 55
End: 2020-09-23

## 2020-10-27 ENCOUNTER — IMAGING SERVICES (OUTPATIENT)
Dept: OTHER | Age: 55
End: 2020-10-27

## 2021-10-21 ENCOUNTER — IMAGING SERVICES (OUTPATIENT)
Dept: OTHER | Age: 56
End: 2021-10-21

## 2022-04-21 LAB
CYTOLOGY CVX/VAG DOC THIN PREP: NORMAL
HPV16+18+45 E6+E7MRNA CVX NAA+PROBE: POSITIVE

## 2023-02-02 ENCOUNTER — IMAGING SERVICES (OUTPATIENT)
Dept: OTHER | Age: 58
End: 2023-02-02

## 2023-02-27 ENCOUNTER — WALK IN (OUTPATIENT)
Dept: URGENT CARE | Age: 58
End: 2023-02-27

## 2023-02-27 VITALS
SYSTOLIC BLOOD PRESSURE: 130 MMHG | RESPIRATION RATE: 16 BRPM | DIASTOLIC BLOOD PRESSURE: 78 MMHG | HEART RATE: 80 BPM | TEMPERATURE: 97.4 F | OXYGEN SATURATION: 99 %

## 2023-02-27 DIAGNOSIS — J06.9 URI, ACUTE: Primary | ICD-10-CM

## 2023-02-27 PROCEDURE — 99204 OFFICE O/P NEW MOD 45 MIN: CPT | Performed by: FAMILY MEDICINE

## 2023-02-27 RX ORDER — DEXTROMETHORPHAN HYDROBROMIDE AND PROMETHAZINE HYDROCHLORIDE 15; 6.25 MG/5ML; MG/5ML
5 SYRUP ORAL 4 TIMES DAILY PRN
Qty: 120 ML | Refills: 0 | Status: SHIPPED | OUTPATIENT
Start: 2023-02-27 | End: 2023-03-09

## 2023-02-27 RX ORDER — PREDNISONE 20 MG/1
40 TABLET ORAL DAILY
Qty: 10 TABLET | Refills: 0 | Status: SHIPPED | OUTPATIENT
Start: 2023-02-27 | End: 2023-03-04

## 2023-06-23 LAB
CYTOLOGY CVX/VAG DOC THIN PREP: NORMAL
HPV16+18+45 E6+E7MRNA CVX NAA+PROBE: NEGATIVE

## 2024-03-29 ENCOUNTER — IMAGING SERVICES (OUTPATIENT)
Dept: OTHER | Age: 59
End: 2024-03-29

## 2024-10-02 ENCOUNTER — APPOINTMENT (OUTPATIENT)
Dept: FAMILY MEDICINE | Age: 59
End: 2024-10-02

## 2024-11-11 ENCOUNTER — APPOINTMENT (OUTPATIENT)
Dept: FAMILY MEDICINE | Age: 59
End: 2024-11-11

## 2024-11-11 VITALS
BODY MASS INDEX: 26.75 KG/M2 | SYSTOLIC BLOOD PRESSURE: 100 MMHG | DIASTOLIC BLOOD PRESSURE: 58 MMHG | HEART RATE: 66 BPM | HEIGHT: 63 IN | OXYGEN SATURATION: 98 % | WEIGHT: 151 LBS | TEMPERATURE: 98.7 F

## 2024-11-11 DIAGNOSIS — F51.05 INSOMNIA DUE TO OTHER MENTAL DISORDER: ICD-10-CM

## 2024-11-11 DIAGNOSIS — M81.0 AGE-RELATED OSTEOPOROSIS WITHOUT CURRENT PATHOLOGICAL FRACTURE: ICD-10-CM

## 2024-11-11 DIAGNOSIS — Z00.00 MEDICARE ANNUAL WELLNESS VISIT, SUBSEQUENT: Primary | ICD-10-CM

## 2024-11-11 DIAGNOSIS — R00.2 HEART PALPITATIONS: ICD-10-CM

## 2024-11-11 DIAGNOSIS — G43.709 CHRONIC MIGRAINE WITHOUT AURA WITHOUT STATUS MIGRAINOSUS, NOT INTRACTABLE: ICD-10-CM

## 2024-11-11 DIAGNOSIS — M54.50 CHRONIC BILATERAL LOW BACK PAIN WITHOUT SCIATICA: ICD-10-CM

## 2024-11-11 DIAGNOSIS — Z23 NEED FOR INFLUENZA VACCINATION: ICD-10-CM

## 2024-11-11 DIAGNOSIS — F32.1 MODERATE MAJOR DEPRESSION  (CMD): ICD-10-CM

## 2024-11-11 DIAGNOSIS — E78.2 MIXED HYPERLIPIDEMIA: ICD-10-CM

## 2024-11-11 DIAGNOSIS — Z12.11 COLON CANCER SCREENING: ICD-10-CM

## 2024-11-11 DIAGNOSIS — I49.8 JUNCTIONAL RHYTHM: ICD-10-CM

## 2024-11-11 DIAGNOSIS — G89.29 CHRONIC BILATERAL LOW BACK PAIN WITHOUT SCIATICA: ICD-10-CM

## 2024-11-11 DIAGNOSIS — Z13.1 SCREENING FOR DIABETES MELLITUS (DM): ICD-10-CM

## 2024-11-11 DIAGNOSIS — Z12.31 ENCOUNTER FOR SCREENING MAMMOGRAM FOR MALIGNANT NEOPLASM OF BREAST: ICD-10-CM

## 2024-11-11 DIAGNOSIS — F99 INSOMNIA DUE TO OTHER MENTAL DISORDER: ICD-10-CM

## 2024-11-11 DIAGNOSIS — F41.1 GAD (GENERALIZED ANXIETY DISORDER): ICD-10-CM

## 2024-11-11 RX ORDER — FOLIC ACID 0.8 MG
500 TABLET ORAL DAILY
Qty: 30 CAPSULE | Refills: 1 | Status: SHIPPED | OUTPATIENT
Start: 2024-11-11

## 2024-11-11 RX ORDER — OMEGA-3/DHA/EPA/FISH OIL 300-1000MG
1 CAPSULE ORAL DAILY
Status: SHIPPED | COMMUNITY
Start: 2024-11-11

## 2024-11-11 RX ORDER — MULTIVITAMIN,THER AND MINERALS
1 TABLET ORAL DAILY
Status: SHIPPED | COMMUNITY
Start: 2024-11-11

## 2024-11-11 RX ORDER — ATORVASTATIN CALCIUM 40 MG/1
40 TABLET, FILM COATED ORAL DAILY
Qty: 90 TABLET | Refills: 3 | Status: SHIPPED | OUTPATIENT
Start: 2024-11-11

## 2024-11-11 RX ORDER — TRAZODONE HYDROCHLORIDE 50 MG/1
50 TABLET, FILM COATED ORAL NIGHTLY
COMMUNITY

## 2024-11-11 RX ORDER — ALPRAZOLAM 0.5 MG
0.5 TABLET ORAL DAILY PRN
COMMUNITY

## 2024-11-11 RX ORDER — HYDROCODONE BITARTRATE AND ACETAMINOPHEN 5; 325 MG/1; MG/1
1 TABLET ORAL EVERY 6 HOURS PRN
Status: SHIPPED | COMMUNITY
Start: 2024-11-11

## 2024-11-11 RX ORDER — RIZATRIPTAN BENZOATE 10 MG/1
10 TABLET ORAL PRN
Qty: 9 TABLET | Refills: 1 | Status: SHIPPED | OUTPATIENT
Start: 2024-11-11

## 2024-11-11 ASSESSMENT — PATIENT HEALTH QUESTIONNAIRE - PHQ9
2. FEELING DOWN, DEPRESSED OR HOPELESS: NEARLY EVERY DAY
8. MOVING OR SPEAKING SO SLOWLY THAT OTHER PEOPLE COULD HAVE NOTICED. OR THE OPPOSITE, BEING SO FIGETY OR RESTLESS THAT YOU HAVE BEEN MOVING AROUND A LOT MORE THAN USUAL: NOT AT ALL
10. IF YOU CHECKED OFF ANY PROBLEMS, HOW DIFFICULT HAVE THESE PROBLEMS MADE IT FOR YOU TO DO YOUR WORK, TAKE CARE OF THINGS AT HOME, OR GET ALONG WITH OTHER PEOPLE: SOMEWHAT DIFFICULT
SUM OF ALL RESPONSES TO PHQ QUESTIONS 1-9: 13
SUM OF ALL RESPONSES TO PHQ9 QUESTIONS 1 AND 2: 5
CLINICAL INTERPRETATION OF PHQ2 SCORE: FURTHER SCREENING NEEDED
SUM OF ALL RESPONSES TO PHQ9 QUESTIONS 1 AND 2: 5
4. FEELING TIRED OR HAVING LITTLE ENERGY: NEARLY EVERY DAY
7. TROUBLE CONCENTRATING ON THINGS, SUCH AS READING THE NEWSPAPER OR WATCHING TELEVISION: SEVERAL DAYS
CLINICAL INTERPRETATION OF PHQ9 SCORE: MODERATE DEPRESSION
9. THOUGHTS THAT YOU WOULD BE BETTER OFF DEAD, OR OF HURTING YOURSELF: NOT AT ALL
6. FEELING BAD ABOUT YOURSELF - OR THAT YOU ARE A FAILURE OR HAVE LET YOURSELF OR YOUR FAMILY DOWN: SEVERAL DAYS
5. POOR APPETITE, WEIGHT LOSS, OR OVEREATING: NOT AT ALL
3. TROUBLE FALLING OR STAYING ASLEEP OR SLEEPING TOO MUCH: NEARLY EVERY DAY
1. LITTLE INTEREST OR PLEASURE IN DOING THINGS: MORE THAN HALF THE DAYS

## 2024-11-13 ENCOUNTER — ANCILLARY PROCEDURE (OUTPATIENT)
Dept: CARDIOLOGY | Age: 59
End: 2024-11-13
Attending: INTERNAL MEDICINE

## 2024-11-13 ENCOUNTER — TELEPHONE (OUTPATIENT)
Dept: CARDIOLOGY | Age: 59
End: 2024-11-13

## 2024-11-13 DIAGNOSIS — I49.8 JUNCTIONAL RHYTHM: ICD-10-CM

## 2024-11-13 DIAGNOSIS — R00.2 HEART PALPITATIONS: ICD-10-CM

## 2024-11-14 ENCOUNTER — LAB SERVICES (OUTPATIENT)
Dept: FAMILY MEDICINE | Age: 59
End: 2024-11-14

## 2024-11-14 DIAGNOSIS — Z00.00 MEDICARE ANNUAL WELLNESS VISIT, SUBSEQUENT: ICD-10-CM

## 2024-11-14 DIAGNOSIS — Z13.1 SCREENING FOR DIABETES MELLITUS (DM): ICD-10-CM

## 2024-11-14 DIAGNOSIS — R00.2 HEART PALPITATIONS: ICD-10-CM

## 2024-11-14 LAB
DEPRECATED RDW RBC: 46.3 FL (ref 39–50)
ERYTHROCYTE [DISTWIDTH] IN BLOOD: 13.5 % (ref 11–15)
HBA1C MFR BLD: 5.5 % (ref 4.5–5.6)
HCT VFR BLD CALC: 40.1 % (ref 36–46.5)
HGB BLD-MCNC: 13.1 G/DL (ref 12–15.5)
MCH RBC QN AUTO: 30.7 PG (ref 26–34)
MCHC RBC AUTO-ENTMCNC: 32.7 G/DL (ref 32–36.5)
MCV RBC AUTO: 93.9 FL (ref 78–100)
NRBC BLD MANUAL-RTO: 0 /100 WBC
PLATELET # BLD AUTO: 203 K/MCL (ref 140–450)
RBC # BLD: 4.27 MIL/MCL (ref 4–5.2)
WBC # BLD: 8.5 K/MCL (ref 4.2–11)

## 2024-11-15 LAB
ALBUMIN SERPL-MCNC: 4 G/DL (ref 3.4–5)
ALBUMIN/GLOB SERPL: 1.5 {RATIO} (ref 1–2.4)
ALP SERPL-CCNC: 69 UNITS/L (ref 45–117)
ALT SERPL-CCNC: 14 UNITS/L
ANION GAP SERPL CALC-SCNC: 11 MMOL/L (ref 7–19)
AST SERPL-CCNC: 18 UNITS/L
BILIRUB SERPL-MCNC: 0.4 MG/DL (ref 0.2–1)
BUN SERPL-MCNC: 13 MG/DL (ref 6–20)
BUN/CREAT SERPL: 14 (ref 7–25)
CALCIUM SERPL-MCNC: 9.7 MG/DL (ref 8.4–10.2)
CHLORIDE SERPL-SCNC: 101 MMOL/L (ref 97–110)
CHOLEST SERPL-MCNC: 183 MG/DL
CHOLEST/HDLC SERPL: 2.7 {RATIO}
CO2 SERPL-SCNC: 30 MMOL/L (ref 21–32)
CREAT SERPL-MCNC: 0.91 MG/DL (ref 0.51–0.95)
EGFRCR SERPLBLD CKD-EPI 2021: 72 ML/MIN/{1.73_M2}
FASTING DURATION TIME PATIENT: NORMAL H
GLOBULIN SER-MCNC: 2.7 G/DL (ref 2–4)
GLUCOSE SERPL-MCNC: 87 MG/DL (ref 70–99)
HDLC SERPL-MCNC: 68 MG/DL
LDLC SERPL CALC-MCNC: 105 MG/DL
NONHDLC SERPL-MCNC: 115 MG/DL
POTASSIUM SERPL-SCNC: 4.9 MMOL/L (ref 3.4–5.1)
PROT SERPL-MCNC: 6.7 G/DL (ref 6.4–8.2)
SODIUM SERPL-SCNC: 137 MMOL/L (ref 135–145)
TRIGL SERPL-MCNC: 49 MG/DL
TSH SERPL-ACNC: 1.53 MCUNITS/ML (ref 0.35–5)

## 2024-11-20 ENCOUNTER — APPOINTMENT (OUTPATIENT)
Dept: CARDIOLOGY | Age: 59
End: 2024-11-20
Attending: INTERNAL MEDICINE

## 2024-11-29 PROCEDURE — 93227 XTRNL ECG REC<48 HR R&I: CPT | Performed by: INTERNAL MEDICINE

## 2024-12-09 ENCOUNTER — APPOINTMENT (OUTPATIENT)
Dept: FAMILY MEDICINE | Age: 59
End: 2024-12-09

## 2024-12-09 VITALS
HEIGHT: 63 IN | OXYGEN SATURATION: 99 % | HEART RATE: 60 BPM | BODY MASS INDEX: 30.83 KG/M2 | WEIGHT: 174 LBS | TEMPERATURE: 98.3 F | DIASTOLIC BLOOD PRESSURE: 58 MMHG | SYSTOLIC BLOOD PRESSURE: 104 MMHG

## 2024-12-09 DIAGNOSIS — F51.05 INSOMNIA DUE TO OTHER MENTAL DISORDER: ICD-10-CM

## 2024-12-09 DIAGNOSIS — R00.2 HEART PALPITATIONS: ICD-10-CM

## 2024-12-09 DIAGNOSIS — F99 INSOMNIA DUE TO OTHER MENTAL DISORDER: ICD-10-CM

## 2024-12-09 DIAGNOSIS — F41.1 GAD (GENERALIZED ANXIETY DISORDER): Primary | ICD-10-CM

## 2024-12-09 PROCEDURE — 99214 OFFICE O/P EST MOD 30 MIN: CPT | Performed by: INTERNAL MEDICINE

## 2024-12-09 RX ORDER — TRAZODONE HYDROCHLORIDE 150 MG/1
150 TABLET ORAL NIGHTLY
Qty: 30 TABLET | Refills: 5 | Status: SHIPPED | OUTPATIENT
Start: 2024-12-09

## 2024-12-09 RX ORDER — PAROXETINE 20 MG/1
10 TABLET, FILM COATED ORAL DAILY
Qty: 90 TABLET | Refills: 1 | Status: SHIPPED | OUTPATIENT
Start: 2024-12-09

## 2024-12-09 ASSESSMENT — PATIENT HEALTH QUESTIONNAIRE - PHQ9
SUM OF ALL RESPONSES TO PHQ9 QUESTIONS 1 AND 2: 0
1. LITTLE INTEREST OR PLEASURE IN DOING THINGS: NOT AT ALL
SUM OF ALL RESPONSES TO PHQ9 QUESTIONS 1 AND 2: 0
2. FEELING DOWN, DEPRESSED OR HOPELESS: NOT AT ALL
CLINICAL INTERPRETATION OF PHQ2 SCORE: NO FURTHER SCREENING NEEDED

## 2025-01-06 RX ORDER — BACLOFEN 20 MG
500 TABLET ORAL DAILY
Qty: 30 TABLET | Refills: 5 | Status: SHIPPED | OUTPATIENT
Start: 2025-01-06

## 2025-01-10 ENCOUNTER — APPOINTMENT (OUTPATIENT)
Dept: CARDIOLOGY | Age: 60
End: 2025-01-10
Attending: INTERNAL MEDICINE

## 2025-01-10 DIAGNOSIS — R00.2 HEART PALPITATIONS: ICD-10-CM

## 2025-01-10 DIAGNOSIS — I49.8 JUNCTIONAL RHYTHM: ICD-10-CM

## 2025-01-10 LAB
AORTIC VALVE AREA (AVA): 0.77
AORTIC VALVE AREA: 2.52
AV MEAN GRADIENT (AVMG): 2.9
AV MEAN VELOCITY (AVMV): 0.85
AV STENOSIS SEVERITY TEXT: NORMAL
AVI LVOT PEAK GRADIENT (LVOTMG): 0.9
E WAVE DECELARATION TIME (MDT): 6.45
INTERVENTRICULAR SEPTUM IN END DIASTOLE (IVSD): 2.58
LEFT INTERNAL DIMENSION IN SYSTOLE (LVSD): 0.8
LEFT VENTRICULAR POSTERIOR WALL IN END DIASTOLE (LVPW): 5.1
LV EF: NORMAL %
LVOT 2D (LVOTD): 24.4
MV E TISSUE VEL LAT (MELV): 1.01
MV E TISSUE VEL MED (MESV): 12.4
MV E WAVE VEL/E TISSUE VEL MED(MSR): 11.9
MV PEAK A VELOCITY (MVPAV): 235
MV PEAK E VELOCITY (MVPEV): 0.53
RV END SYSTOLIC LONGITUDINAL STRAIN FREE WALL (RVGS): 1.9
TRICUSPID VALVE PEAK REGURGITATION VELOCITY (TRPV): 3.1

## 2025-01-10 PROCEDURE — 93306 TTE W/DOPPLER COMPLETE: CPT | Performed by: INTERNAL MEDICINE

## 2025-01-20 ENCOUNTER — APPOINTMENT (OUTPATIENT)
Dept: FAMILY MEDICINE | Age: 60
End: 2025-01-20

## 2025-01-20 VITALS
WEIGHT: 150 LBS | DIASTOLIC BLOOD PRESSURE: 56 MMHG | OXYGEN SATURATION: 97 % | SYSTOLIC BLOOD PRESSURE: 100 MMHG | TEMPERATURE: 99.1 F | BODY MASS INDEX: 26.58 KG/M2 | HEART RATE: 65 BPM | HEIGHT: 63 IN

## 2025-01-20 DIAGNOSIS — R00.2 HEART PALPITATIONS: ICD-10-CM

## 2025-01-20 DIAGNOSIS — G43.709 CHRONIC MIGRAINE WITHOUT AURA WITHOUT STATUS MIGRAINOSUS, NOT INTRACTABLE: ICD-10-CM

## 2025-01-20 DIAGNOSIS — Z80.3 FAMILY HISTORY OF BREAST CANCER: ICD-10-CM

## 2025-01-20 DIAGNOSIS — Z12.39 BREAST CANCER SCREENING, HIGH RISK PATIENT: ICD-10-CM

## 2025-01-20 DIAGNOSIS — F51.05 INSOMNIA DUE TO OTHER MENTAL DISORDER: ICD-10-CM

## 2025-01-20 DIAGNOSIS — B96.89 ACUTE BACTERIAL BRONCHITIS: ICD-10-CM

## 2025-01-20 DIAGNOSIS — F99 INSOMNIA DUE TO OTHER MENTAL DISORDER: ICD-10-CM

## 2025-01-20 DIAGNOSIS — E78.2 MIXED HYPERLIPIDEMIA: Primary | ICD-10-CM

## 2025-01-20 DIAGNOSIS — J20.8 ACUTE BACTERIAL BRONCHITIS: ICD-10-CM

## 2025-01-20 DIAGNOSIS — F41.1 GAD (GENERALIZED ANXIETY DISORDER): ICD-10-CM

## 2025-01-20 PROCEDURE — 99214 OFFICE O/P EST MOD 30 MIN: CPT | Performed by: INTERNAL MEDICINE

## 2025-01-20 RX ORDER — ALPRAZOLAM 0.5 MG
0.5 TABLET ORAL DAILY PRN
Qty: 20 TABLET | Refills: 5 | Status: SHIPPED | OUTPATIENT
Start: 2025-01-20

## 2025-01-20 RX ORDER — AZITHROMYCIN 250 MG/1
TABLET, FILM COATED ORAL
Qty: 6 TABLET | Refills: 0 | Status: SHIPPED | OUTPATIENT
Start: 2025-01-20 | End: 2025-01-25

## 2025-01-20 ASSESSMENT — PATIENT HEALTH QUESTIONNAIRE - PHQ9
1. LITTLE INTEREST OR PLEASURE IN DOING THINGS: NOT AT ALL
SUM OF ALL RESPONSES TO PHQ9 QUESTIONS 1 AND 2: 0
SUM OF ALL RESPONSES TO PHQ9 QUESTIONS 1 AND 2: 0
CLINICAL INTERPRETATION OF PHQ2 SCORE: NO FURTHER SCREENING NEEDED
2. FEELING DOWN, DEPRESSED OR HOPELESS: NOT AT ALL

## 2025-01-20 ASSESSMENT — ANXIETY QUESTIONNAIRES
7. FEELING AFRAID AS IF SOMETHING AWFUL MIGHT HAPPEN: 1
6. BECOMING EASILY ANNOYED OR IRRITABLE: SEVERAL DAYS
3. WORRYING TOO MUCH ABOUT DIFFERENT THINGS: 0
2. NOT BEING ABLE TO STOP OR CONTROL WORRYING: 1
1. FEELING NERVOUS, ANXIOUS, OR ON EDGE: SEVERAL DAYS
IF YOU CHECKED OFF ANY PROBLEMS ON THIS QUESTIONNAIRE, HOW DIFFICULT HAVE THESE PROBLEMS MADE IT FOR YOU TO DO YOUR WORK, TAKE CARE OF THINGS AT HOME, OR GET ALONG WITH OTHER PEOPLE: NOT DIFFICULT AT ALL
5. BEING SO RESTLESS THAT IT IS HARD TO SIT STILL: 0
GAD7 TOTAL SCORE: 4
5. BEING SO RESTLESS THAT IT IS HARD TO SIT STILL: NOT AT ALL
4. TROUBLE RELAXING: 0
1. FEELING NERVOUS, ANXIOUS, OR ON EDGE: 1
6. BECOMING EASILY ANNOYED OR IRRITABLE: 1
3. WORRYING TOO MUCH ABOUT DIFFERENT THINGS: NOT AT ALL
7. FEELING AFRAID AS IF SOMETHING AWFUL MIGHT HAPPEN: SEVERAL DAYS
4. TROUBLE RELAXING: NOT AT ALL
2. NOT BEING ABLE TO STOP OR CONTROL WORRYING: SEVERAL DAYS

## 2025-01-22 ENCOUNTER — APPOINTMENT (OUTPATIENT)
Dept: MAMMOGRAPHY | Age: 60
End: 2025-01-22
Attending: INTERNAL MEDICINE

## 2025-01-22 ENCOUNTER — APPOINTMENT (OUTPATIENT)
Dept: BONE DENSITY | Age: 60
End: 2025-01-22
Attending: INTERNAL MEDICINE

## 2025-01-22 DIAGNOSIS — M81.0 AGE-RELATED OSTEOPOROSIS WITHOUT CURRENT PATHOLOGICAL FRACTURE: ICD-10-CM

## 2025-01-22 LAB
DEXA LT FEMNECK TSCORE: -1
DEXA LT TOTFEM TSCORE: -0.6
DEXA SPINE L1-L4 T-SCORE: -0.2
DEXA SPINE L1-L4 Z-SCORE: 1.3

## 2025-01-22 PROCEDURE — 77080 DXA BONE DENSITY AXIAL: CPT | Performed by: RADIOLOGY

## 2025-02-05 ENCOUNTER — APPOINTMENT (OUTPATIENT)
Dept: CARDIOLOGY | Age: 60
End: 2025-02-05
Attending: INTERNAL MEDICINE

## 2025-02-05 VITALS
HEART RATE: 60 BPM | DIASTOLIC BLOOD PRESSURE: 56 MMHG | OXYGEN SATURATION: 97 % | RESPIRATION RATE: 18 BRPM | BODY MASS INDEX: 26.22 KG/M2 | HEIGHT: 63 IN | SYSTOLIC BLOOD PRESSURE: 100 MMHG | WEIGHT: 148 LBS

## 2025-02-05 DIAGNOSIS — F17.200 CURRENT SMOKER: ICD-10-CM

## 2025-02-05 DIAGNOSIS — R42 LIGHTHEADED: ICD-10-CM

## 2025-02-05 DIAGNOSIS — R00.2 HEART PALPITATIONS: Primary | ICD-10-CM

## 2025-02-05 DIAGNOSIS — E78.2 MIXED HYPERLIPIDEMIA: ICD-10-CM

## 2025-02-05 DIAGNOSIS — R00.1 BRADYCARDIA, UNSPECIFIED: ICD-10-CM

## 2025-02-11 ENCOUNTER — NURSE TRIAGE (OUTPATIENT)
Dept: FAMILY MEDICINE | Age: 60
End: 2025-02-11

## 2025-02-19 ENCOUNTER — TELEPHONE (OUTPATIENT)
Dept: SURGERY | Age: 60
End: 2025-02-19

## 2025-03-03 ENCOUNTER — TELEPHONE (OUTPATIENT)
Dept: FAMILY MEDICINE | Age: 60
End: 2025-03-03

## 2025-03-20 ENCOUNTER — APPOINTMENT (OUTPATIENT)
Dept: FAMILY MEDICINE | Age: 60
End: 2025-03-20

## 2025-03-26 ENCOUNTER — APPOINTMENT (OUTPATIENT)
Dept: CARDIOLOGY | Age: 60
End: 2025-03-26
Attending: INTERNAL MEDICINE

## 2025-04-22 ENCOUNTER — TELEPHONE (OUTPATIENT)
Dept: FAMILY MEDICINE | Age: 60
End: 2025-04-22

## 2025-04-22 ENCOUNTER — APPOINTMENT (OUTPATIENT)
Dept: FAMILY MEDICINE | Age: 60
End: 2025-04-22

## 2025-04-22 ENCOUNTER — APPOINTMENT (OUTPATIENT)
Dept: SURGERY | Age: 60
End: 2025-04-22
Attending: INTERNAL MEDICINE

## 2025-04-22 VITALS
SYSTOLIC BLOOD PRESSURE: 102 MMHG | OXYGEN SATURATION: 97 % | HEART RATE: 55 BPM | HEIGHT: 63 IN | DIASTOLIC BLOOD PRESSURE: 66 MMHG | WEIGHT: 140 LBS | TEMPERATURE: 98.5 F | BODY MASS INDEX: 24.8 KG/M2

## 2025-04-22 DIAGNOSIS — N63.20 MASS OF LEFT BREAST, UNSPECIFIED QUADRANT: ICD-10-CM

## 2025-04-22 DIAGNOSIS — F51.05 INSOMNIA DUE TO OTHER MENTAL DISORDER: ICD-10-CM

## 2025-04-22 DIAGNOSIS — F41.1 GAD (GENERALIZED ANXIETY DISORDER): ICD-10-CM

## 2025-04-22 DIAGNOSIS — N64.59 ABNORMAL BREAST EXAM: Primary | ICD-10-CM

## 2025-04-22 DIAGNOSIS — M54.50 CHRONIC BILATERAL LOW BACK PAIN WITHOUT SCIATICA: ICD-10-CM

## 2025-04-22 DIAGNOSIS — G43.709 CHRONIC MIGRAINE WITHOUT AURA WITHOUT STATUS MIGRAINOSUS, NOT INTRACTABLE: ICD-10-CM

## 2025-04-22 DIAGNOSIS — E78.2 MIXED HYPERLIPIDEMIA: ICD-10-CM

## 2025-04-22 DIAGNOSIS — F99 INSOMNIA DUE TO OTHER MENTAL DISORDER: ICD-10-CM

## 2025-04-22 DIAGNOSIS — G89.29 CHRONIC BILATERAL LOW BACK PAIN WITHOUT SCIATICA: ICD-10-CM

## 2025-04-22 DIAGNOSIS — N63.10 MASS OF RIGHT BREAST, UNSPECIFIED QUADRANT: ICD-10-CM

## 2025-04-22 DIAGNOSIS — Z12.11 SCREENING FOR COLON CANCER: ICD-10-CM

## 2025-04-22 PROCEDURE — 99214 OFFICE O/P EST MOD 30 MIN: CPT | Performed by: HOSPITALIST

## 2025-04-22 RX ORDER — RIZATRIPTAN BENZOATE 10 MG/1
10 TABLET ORAL PRN
Qty: 9 TABLET | Refills: 1 | Status: SHIPPED | OUTPATIENT
Start: 2025-04-22

## 2025-04-22 ASSESSMENT — ENCOUNTER SYMPTOMS
WHEEZING: 0
DIZZINESS: 0
CONSTIPATION: 0
BLOOD IN STOOL: 0
SORE THROAT: 0
CHILLS: 0
NUMBNESS: 0
BACK PAIN: 0
DIAPHORESIS: 0
FEVER: 0
WEAKNESS: 0
APPETITE CHANGE: 0
COUGH: 0
DIARRHEA: 0
NERVOUS/ANXIOUS: 0
ABDOMINAL PAIN: 0
SHORTNESS OF BREATH: 0

## 2025-04-23 ENCOUNTER — IMAGING SERVICES (OUTPATIENT)
Dept: MAMMOGRAPHY | Age: 60
End: 2025-04-23
Attending: HOSPITALIST

## 2025-04-23 ENCOUNTER — IMAGING SERVICES (OUTPATIENT)
Dept: ULTRASOUND IMAGING | Age: 60
End: 2025-04-23
Attending: HOSPITALIST

## 2025-04-23 DIAGNOSIS — N63.10 MASS OF RIGHT BREAST, UNSPECIFIED QUADRANT: ICD-10-CM

## 2025-04-23 DIAGNOSIS — N64.59 ABNORMAL BREAST EXAM: ICD-10-CM

## 2025-04-23 DIAGNOSIS — N63.20 MASS OF LEFT BREAST, UNSPECIFIED QUADRANT: ICD-10-CM

## 2025-04-23 PROCEDURE — 77066 DX MAMMO INCL CAD BI: CPT | Performed by: RADIOLOGY

## 2025-04-23 PROCEDURE — 76642 ULTRASOUND BREAST LIMITED: CPT | Performed by: RADIOLOGY

## 2025-04-24 ENCOUNTER — RESULTS FOLLOW-UP (OUTPATIENT)
Dept: FAMILY MEDICINE | Age: 60
End: 2025-04-24

## 2025-06-03 ENCOUNTER — APPOINTMENT (OUTPATIENT)
Dept: SURGERY | Age: 60
End: 2025-06-03
Attending: INTERNAL MEDICINE

## 2025-06-03 ENCOUNTER — TELEPHONE (OUTPATIENT)
Dept: CARDIOLOGY | Age: 60
End: 2025-06-03

## 2025-06-05 ENCOUNTER — APPOINTMENT (OUTPATIENT)
Dept: CARDIOLOGY | Age: 60
End: 2025-06-05
Attending: INTERNAL MEDICINE

## 2025-06-13 ENCOUNTER — TELEPHONE (OUTPATIENT)
Dept: SURGERY | Age: 60
End: 2025-06-13

## 2025-06-26 DIAGNOSIS — F41.1 GAD (GENERALIZED ANXIETY DISORDER): ICD-10-CM

## 2025-06-27 ENCOUNTER — APPOINTMENT (OUTPATIENT)
Dept: CARDIOLOGY | Age: 60
End: 2025-06-27

## 2025-06-27 RX ORDER — ALPRAZOLAM 0.5 MG
0.5 TABLET ORAL DAILY PRN
Qty: 20 TABLET | Refills: 0 | Status: SHIPPED | OUTPATIENT
Start: 2025-06-27

## 2025-06-30 ENCOUNTER — APPOINTMENT (OUTPATIENT)
Dept: FAMILY MEDICINE | Age: 60
End: 2025-06-30

## 2025-06-30 VITALS
TEMPERATURE: 98.2 F | HEART RATE: 60 BPM | DIASTOLIC BLOOD PRESSURE: 68 MMHG | SYSTOLIC BLOOD PRESSURE: 117 MMHG | RESPIRATION RATE: 16 BRPM | BODY MASS INDEX: 23.79 KG/M2 | HEIGHT: 63 IN | OXYGEN SATURATION: 99 % | WEIGHT: 134.26 LBS

## 2025-06-30 DIAGNOSIS — F99 INSOMNIA DUE TO OTHER MENTAL DISORDER: ICD-10-CM

## 2025-06-30 DIAGNOSIS — Z91.030 HISTORY OF BEE STING ALLERGY: Primary | ICD-10-CM

## 2025-06-30 DIAGNOSIS — F41.1 GAD (GENERALIZED ANXIETY DISORDER): ICD-10-CM

## 2025-06-30 DIAGNOSIS — F51.05 INSOMNIA DUE TO OTHER MENTAL DISORDER: ICD-10-CM

## 2025-06-30 PROCEDURE — 99214 OFFICE O/P EST MOD 30 MIN: CPT | Performed by: HOSPITALIST

## 2025-06-30 RX ORDER — EPINEPHRINE 0.3 MG/.3ML
0.3 INJECTION SUBCUTANEOUS
Qty: 2 EACH | Refills: 1 | Status: SHIPPED | OUTPATIENT
Start: 2025-06-30

## 2025-06-30 ASSESSMENT — ENCOUNTER SYMPTOMS
VOMITING: 0
DIARRHEA: 0
FATIGUE: 0
SHORTNESS OF BREATH: 0
FEVER: 0
NAUSEA: 0
ABDOMINAL PAIN: 0
CONSTIPATION: 1
CHILLS: 0
DIAPHORESIS: 0
NERVOUS/ANXIOUS: 1

## 2025-07-01 ENCOUNTER — TELEPHONE (OUTPATIENT)
Dept: BEHAVIORAL HEALTH | Age: 60
End: 2025-07-01

## 2025-07-08 ENCOUNTER — APPOINTMENT (OUTPATIENT)
Dept: CARDIOLOGY | Age: 60
End: 2025-07-08

## 2025-07-22 ENCOUNTER — APPOINTMENT (OUTPATIENT)
Dept: FAMILY MEDICINE | Age: 60
End: 2025-07-22

## 2025-07-28 ENCOUNTER — APPOINTMENT (OUTPATIENT)
Dept: FAMILY MEDICINE | Age: 60
End: 2025-07-28

## 2025-08-15 ENCOUNTER — APPOINTMENT (OUTPATIENT)
Dept: CARDIOLOGY | Age: 60
End: 2025-08-15

## 2025-08-29 ENCOUNTER — APPOINTMENT (OUTPATIENT)
Dept: FAMILY MEDICINE | Age: 60
End: 2025-08-29

## 2025-08-29 VITALS
HEART RATE: 54 BPM | OXYGEN SATURATION: 98 % | WEIGHT: 138.6 LBS | BODY MASS INDEX: 24.56 KG/M2 | SYSTOLIC BLOOD PRESSURE: 106 MMHG | DIASTOLIC BLOOD PRESSURE: 68 MMHG | TEMPERATURE: 97.9 F | HEIGHT: 63 IN

## 2025-08-29 DIAGNOSIS — Z23 NEED FOR PNEUMOCOCCAL VACCINATION: ICD-10-CM

## 2025-08-29 DIAGNOSIS — G43.709 CHRONIC MIGRAINE WITHOUT AURA WITHOUT STATUS MIGRAINOSUS, NOT INTRACTABLE: ICD-10-CM

## 2025-08-29 DIAGNOSIS — F41.1 GAD (GENERALIZED ANXIETY DISORDER): ICD-10-CM

## 2025-08-29 DIAGNOSIS — Z12.11 COLON CANCER SCREENING: ICD-10-CM

## 2025-08-29 DIAGNOSIS — F32.0 MILD MAJOR DEPRESSION (CMD): ICD-10-CM

## 2025-08-29 DIAGNOSIS — Z79.899 MEDICATION MANAGEMENT: ICD-10-CM

## 2025-08-29 DIAGNOSIS — F99 INSOMNIA DUE TO OTHER MENTAL DISORDER: ICD-10-CM

## 2025-08-29 DIAGNOSIS — M25.551 RIGHT HIP PAIN: ICD-10-CM

## 2025-08-29 DIAGNOSIS — E78.2 MIXED HYPERLIPIDEMIA: ICD-10-CM

## 2025-08-29 DIAGNOSIS — Z00.00 MEDICARE ANNUAL WELLNESS VISIT, SUBSEQUENT: Primary | ICD-10-CM

## 2025-08-29 DIAGNOSIS — Z12.31 ENCOUNTER FOR SCREENING MAMMOGRAM FOR MALIGNANT NEOPLASM OF BREAST: ICD-10-CM

## 2025-08-29 DIAGNOSIS — F51.05 INSOMNIA DUE TO OTHER MENTAL DISORDER: ICD-10-CM

## 2025-08-29 PROBLEM — M16.11 PRIMARY LOCALIZED OSTEOARTHRITIS OF RIGHT HIP: Status: ACTIVE | Noted: 2025-08-29

## 2025-08-29 RX ORDER — TRAZODONE HYDROCHLORIDE 150 MG/1
150 TABLET ORAL NIGHTLY
Qty: 90 TABLET | Refills: 3 | Status: SHIPPED | OUTPATIENT
Start: 2025-08-29 | End: 2025-08-29 | Stop reason: SDUPTHER

## 2025-08-29 RX ORDER — TRAZODONE HYDROCHLORIDE 300 MG/1
300 TABLET ORAL NIGHTLY
Qty: 90 TABLET | Refills: 3 | Status: SHIPPED | OUTPATIENT
Start: 2025-08-29

## 2025-08-29 RX ORDER — ATORVASTATIN CALCIUM 40 MG/1
40 TABLET, FILM COATED ORAL DAILY
Qty: 90 TABLET | Refills: 3 | Status: SHIPPED | OUTPATIENT
Start: 2025-08-29

## 2025-08-29 RX ORDER — ALPRAZOLAM 0.5 MG
0.5 TABLET ORAL 3 TIMES DAILY PRN
Qty: 60 TABLET | Refills: 0 | Status: SHIPPED | OUTPATIENT
Start: 2025-08-29

## 2025-08-29 RX ORDER — RIZATRIPTAN BENZOATE 10 MG/1
10 TABLET ORAL PRN
Qty: 12 TABLET | Refills: 1 | Status: SHIPPED | OUTPATIENT
Start: 2025-08-29

## 2025-08-29 RX ORDER — ACETAMINOPHEN AND CODEINE PHOSPHATE 300; 60 MG/1; MG/1
1 TABLET ORAL EVERY 8 HOURS PRN
Qty: 90 TABLET | Refills: 1 | Status: SHIPPED | OUTPATIENT
Start: 2025-08-29

## 2025-08-29 SDOH — HEALTH STABILITY: MENTAL HEALTH: HOW OFTEN DO YOU HAVE 6 OR MORE DRINKS ON ONE OCCASION?: NEVER

## 2025-08-29 SDOH — HEALTH STABILITY: MENTAL HEALTH: HOW OFTEN DO YOU HAVE A DRINK CONTAINING ALCOHOL?: NEVER

## 2025-08-29 ASSESSMENT — PATIENT HEALTH QUESTIONNAIRE - PHQ9
10. IF YOU CHECKED OFF ANY PROBLEMS, HOW DIFFICULT HAVE THESE PROBLEMS MADE IT FOR YOU TO DO YOUR WORK, TAKE CARE OF THINGS AT HOME, OR GET ALONG WITH OTHER PEOPLE: SOMEWHAT DIFFICULT
6. FEELING BAD ABOUT YOURSELF - OR THAT YOU ARE A FAILURE OR HAVE LET YOURSELF OR YOUR FAMILY DOWN: SEVERAL DAYS
8. MOVING OR SPEAKING SO SLOWLY THAT OTHER PEOPLE COULD HAVE NOTICED. OR THE OPPOSITE, BEING SO FIGETY OR RESTLESS THAT YOU HAVE BEEN MOVING AROUND A LOT MORE THAN USUAL: NOT AT ALL
2. FEELING DOWN, DEPRESSED OR HOPELESS: SEVERAL DAYS
SUM OF ALL RESPONSES TO PHQ9 QUESTIONS 1 AND 2: 1
SUM OF ALL RESPONSES TO PHQ9 QUESTIONS 1 AND 2: 1
9. THOUGHTS THAT YOU WOULD BE BETTER OFF DEAD, OR OF HURTING YOURSELF: NOT AT ALL
CLINICAL INTERPRETATION OF PHQ9 SCORE: POSITIVE, MILD DEPRESSION
3. TROUBLE FALLING OR STAYING ASLEEP OR SLEEPING TOO MUCH: NEARLY EVERY DAY
5. POOR APPETITE, WEIGHT LOSS, OR OVEREATING: NOT AT ALL
SUM OF ALL RESPONSES TO PHQ QUESTIONS 1-9: 7
7. TROUBLE CONCENTRATING ON THINGS, SUCH AS READING THE NEWSPAPER OR WATCHING TELEVISION: NOT AT ALL
4. FEELING TIRED OR HAVING LITTLE ENERGY: MORE THAN HALF THE DAYS
1. LITTLE INTEREST OR PLEASURE IN DOING THINGS: NOT AT ALL

## 2025-08-30 ENCOUNTER — IMAGING SERVICES (OUTPATIENT)
Dept: GENERAL RADIOLOGY | Age: 60
End: 2025-08-30
Attending: INTERNAL MEDICINE

## 2025-08-30 ENCOUNTER — LAB SERVICES (OUTPATIENT)
Dept: LAB | Age: 60
End: 2025-08-30

## 2025-09-04 DIAGNOSIS — M19.90 ARTHRITIS: ICD-10-CM

## 2025-09-04 DIAGNOSIS — M25.551 RIGHT HIP PAIN: Primary | ICD-10-CM

## (undated) NOTE — Clinical Note
Mayank Mcdowell, :1965    CONSENT FOR PROCEDURE/SEDATION    1. I authorize the performance upon Daniel Silva  the following: Colposcopy with biopsy and Endocervical curettage    2.  I authorize Dr. Mari Liang MD Relationship to patient: ____________________________________________    Witness: _________________________________________ Date:___________     Physician Signature: _______________________________ Date:___________

## (undated) NOTE — MR AVS SNAPSHOT
[address]  [address] [address]  [phone]               Thank you for choosing us for your health care visit with Kiko Burch MD.  We are glad to serve you and happy to provide you with this summary of you You should avoid douching, intercourse and tampon use for 3 days following your procedure. Pain    If you are uncomfortable after the procedure, you may use Aleve, Tylenol or Ibuprofen for the discomfort.         If you have additional questions or cassandra

## (undated) NOTE — MR AVS SNAPSHOT
[address] [place] [address] [phone]               Thank you for choosing us for your health care visit with Danna Holloway MD.  We are glad to serve you and happy to provide you with this summary of your v RD. [phone], [address] [place] [address]     Phone:  [phone]    - methylPREDNISolone 4 MG Tbpk            MyChart     Call the [employer] for assistance with your inactive [employer] account    If you have questions, you can call ( Visit [facility] online at  [email or web address]

## (undated) NOTE — ED AVS SNAPSHOT
[address] [facility] in [address] [address]    Phone:  [phone]    Fax:  [address] [phone]   MRN: [id number]    Department:  [address] [facility] in [facility]   Date of Visit:  2/24/2017 Quantity:  10 tablet   Commonly known as:  DELTASONE   Take 1 tablet (20 mg total) by mouth 2 (two) times daily.             Where to Get Your Medications      These medications were sent to [address], [address] [place] [address]. [phone], 630- If you have any problems with your follow-up, please call our  at [phone]. Si usted tiene algun problema con cruz sequimiento, por favor llame a nuestro adminstrador de casos al [phone].     Expect to receive an electronic reques Marilyn Huitron [address] [facility] ([address]) [address] [facility] [address] ([address] [facility] [address]) [address]  ([address]) [address] [facility] PROCEDURE:  XR CHEST PA + LAT CHEST (CPT=71020)     INDICATIONS:  coughing     COMPARISON:  [address], XR CHEST PA + LAT CHEST (CPT=71020), 9/19/2016, 17:17.     TECHNIQUE:  PA and lateral chest radiographs were obtained.      PATIENT ST

## (undated) NOTE — MR AVS SNAPSHOT
[address] [facility] [address] [phone]               Thank you for choosing us for your health care visit with Ari Moran MD.  We are glad to serve you and happy to provide you with this summary of your v your appointment immediately. However, if you are unsure about the requirements for authorization, please wait 5-7 days and then contact your physician's office.  At that time, you will be provided with any authorization numbers or be assured that none are

## (undated) NOTE — LETTER
01/26/18        Angelina CarrLana  [address] [address]  [place] [address]      Dear Jennifer Lucia,    [address] [facility] records indicate that you have outstanding fasting lab work that was ordered for you and has not yet been completed:          HCV Antibody

## (undated) NOTE — ED AVS SNAPSHOT
[facility] in [address] [address]    Phone:  [phone]    Fax:  [phone]           Moni Vega   MRN: [id number]    Department:  [facility] in [facility]   Date of Visit:  6/21/2017 BACK PAIN (ACUTE OR CHRONIC) (ENGLISH)      Disclosure     Insurance plans vary and the physician(s) referred by the Immediate Care may not be covered by your plan.  Please contact your insurance company to determine coverage for follow-up care and referra CARE PHYSICIAN AT ONCE OR GO TO THE EMERGENCY DEPARTMENT. If you have been prescribed any medication(s), please fill your prescription right away and begin taking the medication(s) as directed.     If the Immediate Care Provider has read X-rays, these wi coverage. Patient [address] is a Federal Navigator program that can help with your Affordable Care Act coverage, as well as all types of Medicaid plans.   To get signed up and covered, please call [phone] and ask to get set up for an insuran